# Patient Record
Sex: MALE | Race: WHITE | NOT HISPANIC OR LATINO
[De-identification: names, ages, dates, MRNs, and addresses within clinical notes are randomized per-mention and may not be internally consistent; named-entity substitution may affect disease eponyms.]

---

## 2020-09-30 VITALS — BODY MASS INDEX: 24.87 KG/M2 | HEIGHT: 75 IN | WEIGHT: 200 LBS

## 2021-07-23 PROBLEM — Z00.00 ENCOUNTER FOR PREVENTIVE HEALTH EXAMINATION: Status: ACTIVE | Noted: 2021-07-23

## 2021-07-26 ENCOUNTER — APPOINTMENT (OUTPATIENT)
Dept: PEDIATRIC SURGERY | Facility: CLINIC | Age: 21
End: 2021-07-26
Payer: COMMERCIAL

## 2021-07-26 VITALS
SYSTOLIC BLOOD PRESSURE: 117 MMHG | HEIGHT: 75 IN | WEIGHT: 210.1 LBS | OXYGEN SATURATION: 97 % | TEMPERATURE: 98.6 F | DIASTOLIC BLOOD PRESSURE: 78 MMHG | BODY MASS INDEX: 26.12 KG/M2 | HEART RATE: 78 BPM

## 2021-07-26 DIAGNOSIS — Z87.09 PERSONAL HISTORY OF OTHER DISEASES OF THE RESPIRATORY SYSTEM: ICD-10-CM

## 2021-07-26 DIAGNOSIS — Z82.3 FAMILY HISTORY OF STROKE: ICD-10-CM

## 2021-07-26 PROCEDURE — 99204 OFFICE O/P NEW MOD 45 MIN: CPT

## 2021-07-26 PROCEDURE — 99072 ADDL SUPL MATRL&STAF TM PHE: CPT

## 2021-07-26 NOTE — CONSULT LETTER
[Dear  ___] : Dear  [unfilled], [Consult Letter:] : I had the pleasure of evaluating your patient, [unfilled]. [Please see my note below.] : Please see my note below. [Consult Closing:] : Thank you very much for allowing me to participate in the care of this patient.  If you have any questions, please do not hesitate to contact me. [Sincerely,] : Sincerely, [FreeTextEntry2] : Miguelito Farah MD\par [address] \[place], [place], [address]\par [facility] [facility] [facility] \[place], [place], [address] [FreeTextEntry3] : Leander Miguel MD FAAP FACS\par Director, [facility]\par Division of Pediatric General, Thoracic and Endoscopic Surgery\par [facility]\par \par \par

## 2021-07-26 NOTE — PHYSICAL EXAM
[NL] : soft, not tender, not distended [Patent] : patent [Anus in sphincter complex] : anus in sphincter complex [Midline pits] : midline pits [FreeTextEntry1] : 3 midline pits (lower one dominant)\par draining sinus to left of midline near apex of cleft with surrounding induration and minimal purulent drainage, no tenderness or moe fluctuance\par hirsute - area shaved

## 2021-07-26 NOTE — HISTORY OF PRESENT ILLNESS
[FreeTextEntry1] : Cecilio is a 21 yo male with hx asthma and right elbow surgery.  HE has a 5 year hx of drainage from the sacrococcygeal area.  The area accumulates fluid then drains spontaneously.  He has never required incision and drainage of the area.  HE denies any trauma to the area.  HE presents to the office for evaluation of his pilonidal disease.

## 2021-07-26 NOTE — REASON FOR VISIT
[Initial - Scheduled] : an initial, scheduled visit with concerns of [Pilonidal disease] : pilonidal disease  [FreeTextEntry4] : Miguelito Farah MD\par

## 2021-07-26 NOTE — ASSESSMENT
[FreeTextEntry1] : CECILIO ROGERS is a 20 year male  with long-standing pilonidal disease with midline pits and a chronically draining sinus here for consultation.\par \par I spent considerable time with CECILIO discussing the etiology of pilonidal disease as well as the various treatment options, including conservative management with strict hair control and hygiene, minimal excision with trephination, and complete excision with cleft lift reconstruction.  I discussed the risks, benefits and alternatives of the two surgical procedures including bleeding, infection, wound complications, poor wound healing, postoperative restrictions/expectations, and long-term recurrence.  I additionally explained that if he develops any increased pain he should start antibiotics immediately and schedule follow-up at that point. I gave CECILIO handouts about care and about pilonidal disease in general.\par \par Cecilio would like to move forward with in-office pilonidal excision utilizing the GIPS procedure which I think is sensible given his pattern of disease.\par \par We will work on scheduling in the near future.\par \par All questions were answered and follow-up was arranged.\par

## 2021-08-24 VITALS — HEIGHT: 75 IN | BODY MASS INDEX: 24.87 KG/M2 | WEIGHT: 200 LBS

## 2021-10-29 ENCOUNTER — TRANSCRIPTION ENCOUNTER (OUTPATIENT)
Age: 21
End: 2021-10-29

## 2022-01-13 ENCOUNTER — APPOINTMENT (OUTPATIENT)
Dept: PEDIATRIC SURGERY | Facility: CLINIC | Age: 22
End: 2022-01-13
Payer: COMMERCIAL

## 2022-01-13 VITALS
OXYGEN SATURATION: 98 % | DIASTOLIC BLOOD PRESSURE: 69 MMHG | SYSTOLIC BLOOD PRESSURE: 104 MMHG | HEART RATE: 77 BPM | HEIGHT: 75 IN | TEMPERATURE: 97.9 F

## 2022-01-13 DIAGNOSIS — L98.8 OTHER SPECIFIED DISORDERS OF THE SKIN AND SUBCUTANEOUS TISSUE: ICD-10-CM

## 2022-01-13 PROCEDURE — 11772 EXC PILONIDAL CYST COMP: CPT

## 2022-01-13 PROCEDURE — 99213 OFFICE O/P EST LOW 20 MIN: CPT | Mod: 57

## 2022-01-13 NOTE — HISTORY OF PRESENT ILLNESS
[FreeTextEntry1] : Cecilio is a 21 year old male here today to follow up for pilonidal disease. He was last seen in the office 6 months ago and the option of a minimal excision of pilonidal disease was offered. He has expressed interest and is here today for an in-office pilonidal excision. He denies any pain or discomfort since the previous visit. He complains of persistent drainage. He is otherwise doing well. He has not reported fevers.\par \par

## 2022-01-13 NOTE — CONSULT LETTER
[Dear  ___] : Dear  [unfilled], [Consult Letter:] : I had the pleasure of evaluating your patient, [unfilled]. [Please see my note below.] : Please see my note below. [Consult Closing:] : Thank you very much for allowing me to participate in the care of this patient.  If you have any questions, please do not hesitate to contact me. [Sincerely,] : Sincerely, [FreeTextEntry2] : Miguelito Farah MD [FreeTextEntry3] : Leander Miguel MD FAAP FACS\par Director, [facility]\par Division of Pediatric General, Thoracic and Endoscopic Surgery\par [facility]

## 2022-01-13 NOTE — REASON FOR VISIT
[Follow-up - Scheduled] : a follow-up, scheduled visit for [Pilonidal disease] : pilonidal disease  [Patient] : patient [FreeTextEntry4] : Miguelito Farah MD

## 2022-01-13 NOTE — PHYSICAL EXAM
[NL] : grossly intact [TextBox_59] : four midline pits, open draining sinus to the left of midline, no signs of active infection

## 2022-01-13 NOTE — ASSESSMENT
[FreeTextEntry1] : Cecilio is a 21 year old male with pilonidal disease, here today for an in-office pilonidal excision. \par \par PROCEDURE NOTE\par \par The risks, benefits and alternatives were discussed and consent obtained. I specifically discussed poor wound healing and recurrence.\par \par Patient position prone and buttocks taped laterally.\par Area prepped with betadine and draped\par Local analgesia injected to surgical site\par All opening excised with appropriately sized trephines (8mm for draining opening to left of midline, midline pits excised with 3mm, 3mm, 4mm, 6mm from top down)\par Underlying cavity curetted and all debris removed\par Cavity irrigated with saline and peroxide\par Sterile dressing placed over wounds\par \par Patient tolerated the procedure well and advised to shower daily with wounds uncovered.\par \par All questions answered, follow-up arranged

## 2023-06-16 ENCOUNTER — NON-APPOINTMENT (OUTPATIENT)
Age: 23
End: 2023-06-16

## 2023-06-16 DIAGNOSIS — Z78.9 OTHER SPECIFIED HEALTH STATUS: ICD-10-CM

## 2024-02-29 ENCOUNTER — EMERGENCY (EMERGENCY)
Facility: HOSPITAL | Age: 24
LOS: 1 days | Discharge: HOME | End: 2024-02-29
Admitting: EMERGENCY MEDICINE
Payer: OTHER MISCELLANEOUS

## 2024-02-29 VITALS
TEMPERATURE: 98 F | HEART RATE: 115 BPM | RESPIRATION RATE: 18 BRPM | SYSTOLIC BLOOD PRESSURE: 136 MMHG | OXYGEN SATURATION: 100 % | DIASTOLIC BLOOD PRESSURE: 87 MMHG

## 2024-02-29 PROCEDURE — 99284 EMERGENCY DEPT VISIT MOD MDM: CPT

## 2024-02-29 PROCEDURE — 73502 X-RAY EXAM HIP UNI 2-3 VIEWS: CPT | Mod: 26,RT

## 2024-02-29 PROCEDURE — 73080 X-RAY EXAM OF ELBOW: CPT | Mod: 26,RT

## 2024-02-29 NOTE — ED PROVIDER NOTE - PATIENT PORTAL LINK FT
You can access the FollowMyHealth Patient Portal offered by [facility] by registering at the following website: [email or web address]. By joining [employer]’s FollowMyHealth portal, you will also be able to view your health information using other applications (apps) compatible with our system.

## 2024-02-29 NOTE — ED PROVIDER NOTE - MUSCULOSKELETAL, MLM
Right elbow program tenderness to the olecranon.  No bony deformities no limitations in range of motion freely ranging all joints of the right upper extremity.  Right hip tenderness to the iliac crest patient ambulating steadily no limitations in range of motion freely ranging joints of the right lower extremity.Spine appears normal, range of motion is not limited, no muscle or joint tenderness

## 2024-02-29 NOTE — ED ADULT TRIAGE NOTE - CHIEF COMPLAINT QUOTE
S/P slip and fall. C/o right leg/hip/ and elbow pain. No complaints of chest pain, headache, nausea, dizziness, vomiting  SOB, fever, chills verbalized. phx asthma right elbow surgery

## 2024-02-29 NOTE — ED PROVIDER NOTE - OBJECTIVE STATEMENT
23-year-old male, no significant past medical history, employed as he is still presenting presenting for evaluation of right hip and right elbow pain after sustaining a fall while responding to a call.  Patient reports running into a crowded room, bumping into fellow colleagues causing him to fall.  He states hitting his elbow and right hip upon landing.  He denies head trauma or sustaining any other significant injuries.